# Patient Record
Sex: FEMALE | URBAN - METROPOLITAN AREA
[De-identification: names, ages, dates, MRNs, and addresses within clinical notes are randomized per-mention and may not be internally consistent; named-entity substitution may affect disease eponyms.]

---

## 2017-10-11 ENCOUNTER — APPOINTMENT (RX ONLY)
Dept: URBAN - METROPOLITAN AREA CLINIC 38 | Facility: CLINIC | Age: 37
Setting detail: DERMATOLOGY
End: 2017-10-11

## 2017-10-11 DIAGNOSIS — D22 MELANOCYTIC NEVI: ICD-10-CM

## 2017-10-11 DIAGNOSIS — Z00.00 ENCOUNTER FOR GENERAL ADULT MEDICAL EXAMINATION WITHOUT ABNORMAL FINDINGS: ICD-10-CM

## 2017-10-11 DIAGNOSIS — L20.89 OTHER ATOPIC DERMATITIS: ICD-10-CM

## 2017-10-11 PROBLEM — L20.84 INTRINSIC (ALLERGIC) ECZEMA: Status: ACTIVE | Noted: 2017-10-11

## 2017-10-11 PROBLEM — D22.5 MELANOCYTIC NEVI OF TRUNK: Status: ACTIVE | Noted: 2017-10-11

## 2017-10-11 PROBLEM — Z71.1 PERSON WITH FEARED HEALTH COMPLAINT IN WHOM NO DIAGNOSIS IS MADE: Status: ACTIVE | Noted: 2017-10-11

## 2017-10-11 PROCEDURE — 99202 OFFICE O/P NEW SF 15 MIN: CPT

## 2017-10-11 RX ORDER — TRIAMCINOLONE ACETONIDE 1 MG/G
CREAM TOPICAL BID
Qty: 80 | Refills: 1 | COMMUNITY
Start: 2017-10-11

## 2017-10-11 RX ORDER — CLINDAMYCIN PHOSPHATE 10 MG/G
AEROSOL, FOAM TOPICAL
Qty: 1 | Refills: 2 | COMMUNITY
Start: 2017-10-11

## 2017-10-11 RX ORDER — BENZOYL PEROXIDE 10 G/100G
SUSPENSION TOPICAL
Qty: 1 | Refills: 3 | COMMUNITY
Start: 2017-10-11

## 2017-10-11 RX ADMIN — BENZOYL PEROXIDE: 10 SUSPENSION TOPICAL at 13:24

## 2017-10-11 RX ADMIN — TRIAMCINOLONE ACETONIDE: 1 CREAM TOPICAL at 13:24

## 2017-10-11 RX ADMIN — CLINDAMYCIN PHOSPHATE: 10 AEROSOL, FOAM TOPICAL at 13:24

## 2017-10-11 ASSESSMENT — SEVERITY ASSESSMENT: SEVERITY: MILD TO MODERATE

## 2017-10-11 NOTE — HPI: RASH
How Severe Is Your Rash?: mild
Is This A New Presentation, Or A Follow-Up?: Rash
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

## 2019-12-05 ENCOUNTER — APPOINTMENT (RX ONLY)
Dept: URBAN - METROPOLITAN AREA CLINIC 38 | Facility: CLINIC | Age: 39
Setting detail: DERMATOLOGY
End: 2019-12-05

## 2019-12-05 DIAGNOSIS — L20.89 OTHER ATOPIC DERMATITIS: ICD-10-CM

## 2019-12-05 DIAGNOSIS — L81.0 POSTINFLAMMATORY HYPERPIGMENTATION: ICD-10-CM

## 2019-12-05 PROBLEM — L20.84 INTRINSIC (ALLERGIC) ECZEMA: Status: ACTIVE | Noted: 2019-12-05

## 2019-12-05 PROBLEM — L70.0 ACNE VULGARIS: Status: ACTIVE | Noted: 2019-12-05

## 2019-12-05 PROBLEM — J45.909 UNSPECIFIED ASTHMA, UNCOMPLICATED: Status: ACTIVE | Noted: 2019-12-05

## 2019-12-05 PROCEDURE — 99213 OFFICE O/P EST LOW 20 MIN: CPT

## 2019-12-05 RX ORDER — TRIAMCINOLONE ACETONIDE 1 MG/G
CREAM TOPICAL
Qty: 45 | Refills: 1 | Status: ERX

## 2020-01-28 ENCOUNTER — APPOINTMENT (RX ONLY)
Dept: URBAN - METROPOLITAN AREA CLINIC 38 | Facility: CLINIC | Age: 40
Setting detail: DERMATOLOGY
End: 2020-01-28

## 2020-01-28 DIAGNOSIS — L67.8 OTHER HAIR COLOR AND HAIR SHAFT ABNORMALITIES: ICD-10-CM

## 2020-01-28 DIAGNOSIS — Z00.00 ENCOUNTER FOR GENERAL ADULT MEDICAL EXAMINATION WITHOUT ABNORMAL FINDINGS: ICD-10-CM

## 2020-01-28 PROBLEM — Z71.1 PERSON WITH FEARED HEALTH COMPLAINT IN WHOM NO DIAGNOSIS IS MADE: Status: ACTIVE | Noted: 2020-01-28

## 2020-01-28 PROCEDURE — 99213 OFFICE O/P EST LOW 20 MIN: CPT

## 2020-01-28 NOTE — HPI: FREE FORM (INITIAL HISTORY)
How Severe Is Your Skin Condition? (The Patient Describes The Severity Level As....): severe
What Brings You In Today? (This Is An Xx Year Old Patient Who Presents For...): Dry hair
Where On Your Body Is It? (Located On...): Scalp
Any Associated Symptoms? (The Symptoms Include.....): Brittle hair. No issues on the scalp itself.

## 2021-09-09 NOTE — HPI: DRY SKIN
How Severe Is Your Dry Skin?: mild
Additional History: Started using a Hair moisture product. Doesn’t dye her hair. Happened when she moved from [place] to here. Gradually gotten worse .when she had long hair before she never had this problem
Normal